# Patient Record
Sex: FEMALE | Race: WHITE | NOT HISPANIC OR LATINO
[De-identification: names, ages, dates, MRNs, and addresses within clinical notes are randomized per-mention and may not be internally consistent; named-entity substitution may affect disease eponyms.]

---

## 2024-08-07 ENCOUNTER — NON-APPOINTMENT (OUTPATIENT)
Age: 89
End: 2024-08-07

## 2024-08-08 ENCOUNTER — TRANSCRIPTION ENCOUNTER (OUTPATIENT)
Age: 89
End: 2024-08-08

## 2024-08-16 ENCOUNTER — TRANSCRIPTION ENCOUNTER (OUTPATIENT)
Age: 89
End: 2024-08-16

## 2024-08-20 ENCOUNTER — INPATIENT (INPATIENT)
Facility: HOSPITAL | Age: 89
LOS: 0 days | Discharge: HOME | DRG: 379 | End: 2024-08-21
Attending: STUDENT IN AN ORGANIZED HEALTH CARE EDUCATION/TRAINING PROGRAM | Admitting: STUDENT IN AN ORGANIZED HEALTH CARE EDUCATION/TRAINING PROGRAM
Payer: MEDICARE

## 2024-08-20 ENCOUNTER — TRANSCRIPTION ENCOUNTER (OUTPATIENT)
Age: 89
End: 2024-08-20

## 2024-08-20 VITALS
RESPIRATION RATE: 16 BRPM | WEIGHT: 139.99 LBS | DIASTOLIC BLOOD PRESSURE: 59 MMHG | SYSTOLIC BLOOD PRESSURE: 143 MMHG | TEMPERATURE: 98 F | HEART RATE: 61 BPM | OXYGEN SATURATION: 95 %

## 2024-08-20 DIAGNOSIS — Z90.49 ACQUIRED ABSENCE OF OTHER SPECIFIED PARTS OF DIGESTIVE TRACT: Chronic | ICD-10-CM

## 2024-08-20 DIAGNOSIS — Z90.711 ACQUIRED ABSENCE OF UTERUS WITH REMAINING CERVICAL STUMP: Chronic | ICD-10-CM

## 2024-08-20 PROCEDURE — 99285 EMERGENCY DEPT VISIT HI MDM: CPT

## 2024-08-20 NOTE — ED ADULT TRIAGE NOTE - CHIEF COMPLAINT QUOTE
pt brought in by sandi who states she was recently admitted to [facility] for GI bleed and today had a BM and was noted to have bright red blood pt currently has no complaints at this time

## 2024-08-21 ENCOUNTER — TRANSCRIPTION ENCOUNTER (OUTPATIENT)
Age: 89
End: 2024-08-21

## 2024-08-21 VITALS
SYSTOLIC BLOOD PRESSURE: 173 MMHG | RESPIRATION RATE: 16 BRPM | DIASTOLIC BLOOD PRESSURE: 67 MMHG | OXYGEN SATURATION: 95 % | TEMPERATURE: 98 F | HEART RATE: 68 BPM

## 2024-08-21 DIAGNOSIS — K92.2 GASTROINTESTINAL HEMORRHAGE, UNSPECIFIED: ICD-10-CM

## 2024-08-21 LAB
ALBUMIN SERPL ELPH-MCNC: 3.1 G/DL — LOW (ref 3.3–5.2)
ALP SERPL-CCNC: 88 U/L — SIGNIFICANT CHANGE UP (ref 40–120)
ALT FLD-CCNC: 14 U/L — SIGNIFICANT CHANGE UP
ANION GAP SERPL CALC-SCNC: 11 MMOL/L — SIGNIFICANT CHANGE UP (ref 5–17)
APTT BLD: 29.1 SEC — SIGNIFICANT CHANGE UP (ref 24.5–35.6)
AST SERPL-CCNC: 15 U/L — SIGNIFICANT CHANGE UP
BASOPHILS # BLD AUTO: 0.12 K/UL — SIGNIFICANT CHANGE UP (ref 0–0.2)
BASOPHILS NFR BLD AUTO: 1.2 % — SIGNIFICANT CHANGE UP (ref 0–2)
BILIRUB SERPL-MCNC: 0.3 MG/DL — LOW (ref 0.4–2)
BLD GP AB SCN SERPL QL: SIGNIFICANT CHANGE UP
BUN SERPL-MCNC: 27.2 MG/DL — HIGH (ref 8–20)
CALCIUM SERPL-MCNC: 9.3 MG/DL — SIGNIFICANT CHANGE UP (ref 8.4–10.5)
CHLORIDE SERPL-SCNC: 106 MMOL/L — SIGNIFICANT CHANGE UP (ref 96–108)
CO2 SERPL-SCNC: 22 MMOL/L — SIGNIFICANT CHANGE UP (ref 22–29)
CREAT SERPL-MCNC: 1.27 MG/DL — SIGNIFICANT CHANGE UP (ref 0.5–1.3)
EGFR: 40 ML/MIN/1.73M2 — LOW
EOSINOPHIL # BLD AUTO: 0.47 K/UL — SIGNIFICANT CHANGE UP (ref 0–0.5)
EOSINOPHIL NFR BLD AUTO: 4.5 % — SIGNIFICANT CHANGE UP (ref 0–6)
GLUCOSE SERPL-MCNC: 125 MG/DL — HIGH (ref 70–99)
HCT VFR BLD CALC: 25.8 % — LOW (ref 34.5–45)
HCT VFR BLD CALC: 28 % — LOW (ref 34.5–45)
HGB BLD-MCNC: 8.4 G/DL — LOW (ref 11.5–15.5)
HGB BLD-MCNC: 9.2 G/DL — LOW (ref 11.5–15.5)
IMM GRANULOCYTES NFR BLD AUTO: 0.3 % — SIGNIFICANT CHANGE UP (ref 0–0.9)
INR BLD: 1.08 RATIO — SIGNIFICANT CHANGE UP (ref 0.85–1.18)
LYMPHOCYTES # BLD AUTO: 1.95 K/UL — SIGNIFICANT CHANGE UP (ref 1–3.3)
LYMPHOCYTES # BLD AUTO: 18.8 % — SIGNIFICANT CHANGE UP (ref 13–44)
MCHC RBC-ENTMCNC: 31.7 PG — SIGNIFICANT CHANGE UP (ref 27–34)
MCHC RBC-ENTMCNC: 32.1 PG — SIGNIFICANT CHANGE UP (ref 27–34)
MCHC RBC-ENTMCNC: 32.6 GM/DL — SIGNIFICANT CHANGE UP (ref 32–36)
MCHC RBC-ENTMCNC: 32.9 GM/DL — SIGNIFICANT CHANGE UP (ref 32–36)
MCV RBC AUTO: 97.4 FL — SIGNIFICANT CHANGE UP (ref 80–100)
MCV RBC AUTO: 97.6 FL — SIGNIFICANT CHANGE UP (ref 80–100)
MONOCYTES # BLD AUTO: 1.23 K/UL — HIGH (ref 0–0.9)
MONOCYTES NFR BLD AUTO: 11.9 % — SIGNIFICANT CHANGE UP (ref 2–14)
NEUTROPHILS # BLD AUTO: 6.55 K/UL — SIGNIFICANT CHANGE UP (ref 1.8–7.4)
NEUTROPHILS NFR BLD AUTO: 63.3 % — SIGNIFICANT CHANGE UP (ref 43–77)
OB PNL STL: POSITIVE
PLATELET # BLD AUTO: 273 K/UL — SIGNIFICANT CHANGE UP (ref 150–400)
PLATELET # BLD AUTO: 278 K/UL — SIGNIFICANT CHANGE UP (ref 150–400)
POTASSIUM SERPL-MCNC: 3.6 MMOL/L — SIGNIFICANT CHANGE UP (ref 3.5–5.3)
POTASSIUM SERPL-SCNC: 3.6 MMOL/L — SIGNIFICANT CHANGE UP (ref 3.5–5.3)
PROT SERPL-MCNC: 5.4 G/DL — LOW (ref 6.6–8.7)
PROTHROM AB SERPL-ACNC: 12 SEC — SIGNIFICANT CHANGE UP (ref 9.5–13)
RBC # BLD: 2.65 M/UL — LOW (ref 3.8–5.2)
RBC # BLD: 2.87 M/UL — LOW (ref 3.8–5.2)
RBC # FLD: 13.2 % — SIGNIFICANT CHANGE UP (ref 10.3–14.5)
RBC # FLD: 13.2 % — SIGNIFICANT CHANGE UP (ref 10.3–14.5)
SODIUM SERPL-SCNC: 139 MMOL/L — SIGNIFICANT CHANGE UP (ref 135–145)
WBC # BLD: 10.35 K/UL — SIGNIFICANT CHANGE UP (ref 3.8–10.5)
WBC # BLD: 9.6 K/UL — SIGNIFICANT CHANGE UP (ref 3.8–10.5)
WBC # FLD AUTO: 10.35 K/UL — SIGNIFICANT CHANGE UP (ref 3.8–10.5)
WBC # FLD AUTO: 9.6 K/UL — SIGNIFICANT CHANGE UP (ref 3.8–10.5)

## 2024-08-21 PROCEDURE — 93970 EXTREMITY STUDY: CPT | Mod: 26

## 2024-08-21 PROCEDURE — 99223 1ST HOSP IP/OBS HIGH 75: CPT | Mod: AI

## 2024-08-21 PROCEDURE — 43235 EGD DIAGNOSTIC BRUSH WASH: CPT

## 2024-08-21 PROCEDURE — 99223 1ST HOSP IP/OBS HIGH 75: CPT | Mod: FS,25

## 2024-08-21 RX ORDER — ACETAMINOPHEN 325 MG/1
650 TABLET ORAL EVERY 6 HOURS
Refills: 0 | Status: DISCONTINUED | OUTPATIENT
Start: 2024-08-21 | End: 2024-08-21

## 2024-08-21 RX ORDER — MAGNESIUM, ALUMINUM HYDROXIDE 200-225/5
30 SUSPENSION, ORAL (FINAL DOSE FORM) ORAL EVERY 4 HOURS
Refills: 0 | Status: DISCONTINUED | OUTPATIENT
Start: 2024-08-21 | End: 2024-08-21

## 2024-08-21 RX ORDER — AMLODIPINE BESYLATE 10 MG/1
5 TABLET ORAL DAILY
Refills: 0 | Status: DISCONTINUED | OUTPATIENT
Start: 2024-08-21 | End: 2024-08-21

## 2024-08-21 RX ORDER — ONDANSETRON 2 MG/ML
4 INJECTION, SOLUTION INTRAMUSCULAR; INTRAVENOUS EVERY 8 HOURS
Refills: 0 | Status: DISCONTINUED | OUTPATIENT
Start: 2024-08-21 | End: 2024-08-21

## 2024-08-21 RX ORDER — PANTOPRAZOLE SODIUM 40 MG
40 TABLET, DELAYED RELEASE (ENTERIC COATED) ORAL ONCE
Refills: 0 | Status: COMPLETED | OUTPATIENT
Start: 2024-08-21 | End: 2024-08-21

## 2024-08-21 RX ORDER — GABAPENTIN 100 MG
300 CAPSULE ORAL THREE TIMES A DAY
Refills: 0 | Status: DISCONTINUED | OUTPATIENT
Start: 2024-08-21 | End: 2024-08-21

## 2024-08-21 RX ORDER — TAMSULOSIN HYDROCHLORIDE 0.4 MG/1
0.4 CAPSULE ORAL AT BEDTIME
Refills: 0 | Status: DISCONTINUED | OUTPATIENT
Start: 2024-08-21 | End: 2024-08-21

## 2024-08-21 RX ORDER — PANTOPRAZOLE SODIUM 40 MG
40 TABLET, DELAYED RELEASE (ENTERIC COATED) ORAL EVERY 12 HOURS
Refills: 0 | Status: DISCONTINUED | OUTPATIENT
Start: 2024-08-21 | End: 2024-08-21

## 2024-08-21 RX ORDER — SUCRALFATE 1 G/10ML
1 SUSPENSION ORAL
Refills: 0 | Status: DISCONTINUED | OUTPATIENT
Start: 2024-08-21 | End: 2024-08-21

## 2024-08-21 RX ADMIN — Medication 300 MILLIGRAM(S): at 14:32

## 2024-08-21 RX ADMIN — Medication 40 MILLIGRAM(S): at 02:55

## 2024-08-21 NOTE — DISCHARGE NOTE PROVIDER - ATTENDING DISCHARGE PHYSICAL EXAMINATION:
CONSTITUTIONAL: no apparent distress  EYES: PERRLA, EOMI, non-icteric  ENMT: Oral mucosa with moist membranes  RESP: No respiratory distress, clear to auscultation bilaterally, no wheezes or rales  CV: RRR, +S1S2  GI: Soft, NT, ND  PSYCH: A+O x 3, mood and affect appropriate  NEURO: Cooperative, upper and lower motor function grossly intact bilaterally, sensation grossly intact throughout  EXTREMITIES: + 2 pedal edema bilaterally, no tenderness, pedal pulses present

## 2024-08-21 NOTE — CONSULT NOTE ADULT - ASSESSMENT
89 y/o F with PMHX HTN, colon resection due to diverticular disease (45 years ago) presented to ED c/o dark stool    #Dark stool, anemia  #Hx of Dieulafoy lesion  EGD (08.09.24)- actively oozing Dielafoy lesion seen in the proximal body of the stomach, gold probe cautery and three endoscopic hemoclips were applied    - NPO for EGD today, 8/21/24  - Trend CBC, transfuse as needed. Monitor for signs of bleeding.   - Avoid NSAIDs  - IV PPI BID for GI mucosal cytoprotection  - Further plan pending EGD  _________________________________________________________________  Assessment and recommendations are final when note is signed by the attending physician.

## 2024-08-21 NOTE — ED PROVIDER NOTE - OBJECTIVE STATEMENT
89y F w/ hx HTN, colon resection due to diverticular disease; presents for rectal bleeding. Pt was just discharged from this hospital after last week after being admitted with melena -- was found to have Dieulafoy lesion on EGD that was cauterized. Pt reports that her stools had been getting lighter since being discharged; however over the past day has had darker stool. Per daughter also had small amount of bright red rectal bleeding tonight. Pt denying abdominal pain, fever or other complaints.

## 2024-08-21 NOTE — ED ADULT NURSE REASSESSMENT NOTE - NSFALLHARMRISKINTERV_ED_ALL_ED

## 2024-08-21 NOTE — ED ADULT NURSE NOTE - ED STAT RN HANDOFF DETAILS
Report given to [facility] JESSE MONTENEGRO Pt at endoscopy during time of report. Pt spoke to endoscope JESSE Cano to advise pt will be going to [facility] [address]; understanding verbalized.

## 2024-08-21 NOTE — DISCHARGE NOTE PROVIDER - NSDCCPCAREPLAN_GEN_ALL_CORE_FT
PRINCIPAL DISCHARGE DIAGNOSIS  Diagnosis: GI bleed  Assessment and Plan of Treatment: Continue pantoprazole as prescribed and follow up with your GI specialist at the next scheduled appointment.   - if you begin to feel shortness of breath, fatigue, or dizziness with standing or moving around please go to the nearest ER for evaluation      SECONDARY DISCHARGE DIAGNOSES  Diagnosis: Bilateral swelling of feet  Assessment and Plan of Treatment: Use compression socks when ambulating and when resting keep your feet elevated. Follow up with your primary care doctor to consider switching from amlodipine to a different medication for blood pressure.    Diagnosis: Urinary retention  Assessment and Plan of Treatment: Monitor your ability to urinate closely and if you feel you are not able to fully empty your bladder or you begin to have pain with urination or urinary frequency.

## 2024-08-21 NOTE — PATIENT PROFILE ADULT - FUNCTIONAL ASSESSMENT - DAILY ACTIVITY SCORE.
If fever or symptoms persist  or worsen over next 48h return to UC or ER   >> follow-up with PCP with in 5-7 days     WHAT TO DRINK :    Pedialyte 6-8 ounces 2-4 x/day  for 3-5 days ; another option  \"Drip Drop \" powdered electrolyte packet   Soup 3-4 times per day x 3 day ( caution if on  Salt restricted diet) not cream based   Coconut water  V 8 Splash  FRUIT- based     NOT water /Gatorade /powerade / vitamin water / electrolyte water: not have enough electrolytes    What to take for  Nose /throat /ear symptoms :    Flonase :  opposite hand opposite nostril  2pf 2xd x 14d   Nasal saline 2-3 puffs 3-4 times per day ×5 days  Mucinex/guaifenesin 400 mg 3 times a day × 10 days  Gargle hot salt water ( donot swallow ) 3-5 x/day   Not DayQuil/ NyQuil /Sudafed /Theraflu/comtrex/dimetapp/Yani-seltzer: not multi symptom product    MAKE AT HOME   >>HONEY + 2 tbsp 3 times per day x3 days of make sure below   2 tblsp of each :  Lemon / apple cider vinegar /tumerick / peter   mix together, take straight  or add added  warm or cold liquid     What to take for stomach/ Intestinal discomfort /bloat     > GAS -X ;  gaviscon   -Probiotics:Choose one, take 2 time per day for length of Rx  Florostor  florogjen  Acidophilus  Aloe Vera Juice     Self care / home instructions   - ibuprofen 2 pills mixed with 2 pills Tylenol, same mouth full up to 3 times per day IF safe for your stomach   - Bathroom:  open windows; fan on ; keep bathroom cool and DRY , open car  windows   -Open windows/ get outside at least 3 times per day to circulate clean air    Vitamins to purchase over the counter    -Vitamin-C 500 mg /day ; Vitamin D 400 units/day ; Zinc 50 mg /day     >SLEEP on stomach/side : allow gravity to allow more air in lungs /aid alveoli filling    >EAT more Protein ,4xd     Purchase PULSE OXIMETER , if oxygen stays below 90% at rest go to ER   If more SOB/cannot finish sentence / CP / incr Fever/ dizziness /  confusion / worsening vomiting +/or diarrhea /feeling more UNWELL>  HOSPITAL ER   ( ph # below )    If STREP + > quarantine 2 days, need Antibiotics ; inform contacts from 72 h ago ,throw away tooth brush after 3 d   If INFLUENZA + > quarantine 4 days;  possible Tamiflu  rx ; inform all contacts  from 7 days  ago  IF RSV + >  causes cough and bronchospasm, highly contagious & may be dangerous for children under 3 yo,  quarantine 4 days, may be prescribed inhaler  If MONO + > Off school/work 1 week , need follow-up labs ,no contact sports/impact activities until seen by PCP ;  see PCP 5-6 days    IF COVID + > quarantine 5 d from positive test   no work/school /sports / travel   RTW  day #6  ,if NO symptoms,   Then mask for 5 more days   purchase pulse Oximeter: if <90% go to ER,  inform contacts from 7 -10 days prior      Other viral illness   Parainfluenza, enterovirus, rhinovirus, adenovirus, HumanMetaPneumoVirus  ( HMPV), Norovirus,  ParvoVirus     18

## 2024-08-21 NOTE — PATIENT PROFILE ADULT - DO YOU FEEL THREATENED BY OTHERS?
Called and LM  Joshua Whatley Will call back to discuss    Need data to make changes before next weeks visit    Creat up    bnp up but not as bad as before  Joshua Whatley no

## 2024-08-21 NOTE — PATIENT PROFILE ADULT - FALL HARM RISK - HARM RISK INTERVENTIONS

## 2024-08-21 NOTE — ED PROVIDER NOTE - CLINICAL SUMMARY MEDICAL DECISION MAKING FREE TEXT BOX
89y F presents with rectal bleeding. Was just discharged from this hospital last week after having gastric Dieulafoy lesion cauterized. Pt hemodynamically stable. Noted to have dark Guaiac+ stool, no gross blood. Initial Hgb improved compared to recent admission. Will consult GI, check serial H/H. 89y F presents with rectal bleeding. Was just discharged from this hospital last week after having gastric Dieulafoy lesion cauterized. Pt hemodynamically stable. Noted to have dark Guaiac+ stool, no gross blood. Initial Hgb improved compared to recent admission. Will consult GI, check serial H/H.    Hgb 9.2 --> 8.4. Pt consented for possible PRBCs. GI consult pending. Admitted to medicine.

## 2024-08-21 NOTE — ED PROVIDER NOTE - PHYSICAL EXAMINATION
Constitutional: Awake, alert, in no acute distress  Eyes: no scleral icterus  HENT: normocephalic, atraumatic, moist oral mucosa  Neck: supple  CV: RRR, no murmur  Pulm: non-labored respirations, CTAB  Abdomen: soft, non-tender, non-distended  Rectal: +external hemorrhoids, +dark stool in vault, no bright red blood noted. Chaperoned by JESSE Vargas.  Extremities: no edema, no deformity  Skin: no rash, no jaundice  Neuro: AAOx3, moving all extremities equally

## 2024-08-21 NOTE — DISCHARGE NOTE PROVIDER - NSDCMRMEDTOKEN_GEN_ALL_CORE_FT
amLODIPine 5 mg oral tablet: 1 tab(s) orally once a day  gabapentin 300 mg oral capsule: 1 cap(s) orally 3 times a day  labetalol 100 mg oral tablet: 1 tab(s) orally 2 times a day  Protonix 40 mg oral delayed release tablet: 1 tab(s) orally 2 times a day  tamsulosin 0.4 mg oral capsule: 1 cap(s) orally once a day (at bedtime)

## 2024-08-21 NOTE — CONSULT NOTE ADULT - SUBJECTIVE AND OBJECTIVE BOX
Chief Complaint:  Patient is a 89y old  Female who presents with a chief complaint of Suspected GI bleeding (21 Aug 2024 06:39)        Patient is a 89y old  Female who presents with a chief complaint of Suspected GI bleeding (21 Aug 2024 06:39)      HPI: 89 y/o F with PMHX HTN, colon resection due to diverticular disease (45 years ago) presented to ED c/o dark stool. Patient notes her daughter who is a physician "did not like the color of the stool." She thinks her bowl movements were starting to become brown but then noticed darker stool (more dark green than black). Patient was recently admitted on 8/8/24 for similar symptoms. She underwent EGD on 8/9/24 that showed actively oozing Dieulafoy lesion seen in the proximal body of the stomach, gold probe cautery and three endoscopic hemoclips were applied. She does not take any blood thinners. Reports occasional NSAIDs use. Hgb 9.2 -> 8.4, BUN 27.2. No imaging available.       PAST MEDICAL & SURGICAL HISTORY:  History of appendectomy      S/P partial resection of colon      S/P partial hysterectomy          REVIEW OF SYSTEMS:   General: Negative  HEENT: Negative  CV: Negative  Respiratory: Negative  GI: See HPI  : Negative  MSK: Negative  Hematologic: Negative  Skin: Negative    MEDICATIONS:   MEDICATIONS  (STANDING):  amLODIPine   Tablet 5 milliGRAM(s) Oral daily  gabapentin 300 milliGRAM(s) Oral three times a day  labetalol 100 milliGRAM(s) Oral two times a day  pantoprazole  Injectable 40 milliGRAM(s) IV Push every 12 hours  sucralfate 1 Gram(s) Oral four times a day  tamsulosin 0.4 milliGRAM(s) Oral at bedtime    MEDICATIONS  (PRN):  acetaminophen     Tablet .. 650 milliGRAM(s) Oral every 6 hours PRN Temp greater or equal to 38C (100.4F), Mild Pain (1 - 3)  aluminum hydroxide/magnesium hydroxide/simethicone Suspension 30 milliLiter(s) Oral every 4 hours PRN Dyspepsia  melatonin 3 milliGRAM(s) Oral at bedtime PRN Insomnia  ondansetron Injectable 4 milliGRAM(s) IV Push every 8 hours PRN Nausea and/or Vomiting          DIET:  Diet, NPO:   Except Medications (08-21-24 @ 09:47) [Active]          ALLERGIES:   Allergies    Allergy Status Unknown    Intolerances        Substance Use:   (  ) never used  (  ) other:  Tobacco Usage:  (   ) never smoked   (   ) former smoker   (   ) current smoker  (     ) pack year  (        ) last cigarette date  Alcohol Usage:    Family History   IBD (  ) Yes   (  ) No  GI Malignancy (  )  Yes    (  ) No    Health Management  Last Colonoscopy:  Last Endoscopy:     VITAL SIGNS:   Vital Signs Last 24 Hrs  T(C): 36.7 (21 Aug 2024 07:50), Max: 36.9 (20 Aug 2024 22:46)  T(F): 98 (21 Aug 2024 07:50), Max: 98.5 (20 Aug 2024 22:46)  HR: 78 (21 Aug 2024 07:50) (61 - 78)  BP: 154/69 (21 Aug 2024 07:50) (143/59 - 154/69)  BP(mean): --  RR: 16 (21 Aug 2024 07:50) (16 - 17)  SpO2: 97% (21 Aug 2024 07:50) (94% - 97%)    Parameters below as of 21 Aug 2024 04:55  Patient On (Oxygen Delivery Method): room air      I&O's Summary      PHYSICAL EXAM:   GENERAL:  No acute distress  HEENT:  NC/AT, conjunctiva clear, sclera anicteric  CHEST:  No increased effort  HEART:  Regular rate  ABDOMEN:  Soft, non-tender, non-distended, no rebound or guarding  SKIN:  Warm, dry  NEURO:  Calm, cooperative    LABS:                        8.4    9.60  )-----------( 273      ( 21 Aug 2024 05:00 )             25.8     Hemoglobin: 8.4 g/dL (08-21-24 @ 05:00)  Hemoglobin: 9.2 g/dL (08-21-24 @ 01:30)    08-21    139  |  106  |  27.2<H>  ----------------------------<  125<H>  3.6   |  22.0  |  1.27    Ca    9.3      21 Aug 2024 01:30    TPro  5.4<L>  /  Alb  3.1<L>  /  TBili  0.3<L>  /  DBili  x   /  AST  15  /  ALT  14  /  AlkPhos  88  08-21    LIVER FUNCTIONS - ( 21 Aug 2024 01:30 )  Alb: 3.1 g/dL / Pro: 5.4 g/dL / ALK PHOS: 88 U/L / ALT: 14 U/L / AST: 15 U/L / GGT: x             PT/INR - ( 21 Aug 2024 01:30 )   PT: 12.0 sec;   INR: 1.08 ratio         PTT - ( 21 Aug 2024 01:30 )  PTT:29.1 sec                RADIOLOGY & ADDITIONAL STUDIES:    < from: EGD (08.09.24 @ 00:00) >    Findings:    Stomach Additional stomach findings - Actively oozing gastric Dieulafoy lesion    seen in the proximal body of the stomach. Gold probe bipolar cautery was    initially applied with some slowing of oozing but with suboptimal results. Three    endoscopic hemoclips were then placed over Dieulafoy lesion with good hemostasis    and no further oozing..    -On retroflexion a small clot was seen with liquid blood. The liquid blood was    successfully suctioned with no bleeding lesion or active bleeding seen in the    fundus..    Duodenum Mucosa Normal mucosa was noted in the whole duodenum. No duodenitis or    ulcerations seen.        Other Interventions:    Three endoclips were successfully applied to the proximal body of the stomach    for the purpose of hemostasis. Three endoscopic hemoclips were successfully    deployed with good hemostasis.    Bi-cap electrocautery was unsuccessfully applied for hemostasis. Three    endoscopic hemoclips were subsequently deployed with good hemostasis.        Impressions:    Normal mucosa in the whole examined duodenum.    - Actively oozing gastric Dieulafoy lesion seen in the proximal body of the    stomach. Gold probe bipolar cautery was initially applied with some slowing of    oozing but with suboptimal results. Three endoscopic hemoclips were then placed    over Dieulafoy lesion with good hemostasis and no further oozing. .    -On retroflexion a small clot was seen with liquid blood. The liquid blood was    successfully suctioned with no bleeding lesion or active bleeding seen in the    fundus. .        Plan:    NPO for tonight. IV Pantoprazole 40 mg. every 12 hours. Keep Hb at 8 grams or    higher. Repeat labs ordered for the AM.        Additional Notes:    Actively bleeding gastric Dieulafoy lesion seen and cauterized and clipped with    good hemostasis and no further bleeding at the conclusion of this procedure.    < end of copied text >

## 2024-08-21 NOTE — ED ADULT NURSE REASSESSMENT NOTE - NS ED NURSE REASSESS COMMENT FT1
Patient remains A&Ox4, denies sob/chest pain, no bloody BM noted this shift.  Patient is admitted to telemetry under medicine service for GIB.  Pending GI consult.  VSS with no complaints voiced.
Assumed care of pt in ED. Pt A&O x 4 presents to ED c/o episode of dark tarry stool. Pt denies complaints during assessment. Abdomen soft and nontender. No N/V/D. Reed in place. Pt remains on tele with . Bed locked and in lowest position. Call bell within reach. Able to make needs known/

## 2024-08-21 NOTE — H&P ADULT - NSHPLABSRESULTS_GEN_ALL_CORE
8.4    9.60  )-----------( 273      ( 21 Aug 2024 05:00 )             25.8     08-21    139  |  106  |  27.2<H>  ----------------------------<  125<H>  3.6   |  22.0  |  1.27    Ca    9.3      21 Aug 2024 01:30    TPro  5.4<L>  /  Alb  3.1<L>  /  TBili  0.3<L>  /  DBili  x   /  AST  15  /  ALT  14  /  AlkPhos  88  08-21    EGD (08.09.24 @ 00:00) >  Stomach Additional stomach findings - Actively oozing gastric Dieulafoy lesion  seen in the proximal body of the stomach. Gold probe bipolar cautery was  initially applied with some slowing of oozing but with suboptimal results. Three  endoscopic hemoclips were then placed over Dieulafoy lesion with good hemostasis  and no further oozing..    -On retroflexion a small clot was seen with liquid blood. The liquid blood was  successfully suctioned with no bleeding lesion or active bleeding seen in the  fundus..      Duodenum Mucosa Normal mucosa was noted in the whole duodenum. No duodenitis or  ulcerations seen.      Other Interventions:  Three endoclips were successfully applied to the proximal body of the stomach  for the purpose of hemostasis. Three endoscopic hemoclips were successfully  deployed with good hemostasis.    Bi-cap electrocautery was unsuccessfully applied for hemostasis. Three  endoscopic hemoclips were subsequently deployed with good hemostasis.    Impressions:  Normal mucosa in the whole examined duodenum.    - Actively oozing gastric Dieulafoy lesion seen in the proximal body of the  stomach. Gold probe bipolar cautery was initially applied with some slowing of  oozing but with suboptimal results. Three endoscopic hemoclips were then placed  over Dieulafoy lesion with good hemostasis and no further oozing. .    -On retroflexion a small clot was seen with liquid blood. The liquid blood was  successfully suctioned with no bleeding lesion or active bleeding seen in the  fundus. .

## 2024-08-21 NOTE — DISCHARGE NOTE PROVIDER - HOSPITAL COURSE
89yo female with hx of HTN, colon resection 45yrs ago due to diverticular disease who presents to the hospital today for concern of progressively darkening stools over one day. Pt recently admitted to [facility] for symptomatic anemia and GI bleed. She underwent EGD which found bleeding ulcer s/p cauterization and hemoclipping. She was discharged 8/16 and she reports feeling like she was getting better afterwards. However, because of dark stool today came to the hospital. Hgb on prior d/c 8.5. 9.2 on arrival to ED today with trend to 8.4. GI consulted. Pt underwent EGD which did not show any evidence of bleeding. Pt remained stable afterwards. During this stay pt's alston from prior admission was removed and she underwent TOV successfully. She is medically optimized for discharge home today.

## 2024-08-21 NOTE — DISCHARGE NOTE PROVIDER - CARE PROVIDER_API CALL
Go Sood  Gastroenterology  [address], [address]  [place] [address]  Phone: [phone]  Fax: [phone]  Follow Up Time: 2 weeks

## 2024-08-21 NOTE — CONSULT NOTE ADULT - NS ATTEND AMEND GEN_ALL_CORE FT
I agree with assessment and plan as above. Pt with recent active upper GI bleed due to Dieulafoy lesion s/p gold probe and clip on 8/9/24. Presenting back now due to recurrence of dark stools. Hgb relatively stable from discharge. Continue PPI. Keep NPO for EGD today. See endoscopy report for post procedure plan.

## 2024-08-21 NOTE — H&P ADULT - NSHPPHYSICALEXAM_GEN_ALL_CORE
T(C): 36.7 (08-21-24 @ 07:50), Max: 36.9 (08-20-24 @ 22:46)  HR: 78 (08-21-24 @ 07:50) (61 - 78)  BP: 154/69 (08-21-24 @ 07:50) (143/59 - 154/69)  RR: 16 (08-21-24 @ 07:50) (16 - 17)  SpO2: 97% (08-21-24 @ 07:50) (94% - 97%)    CONSTITUTIONAL: no apparent distress  EYES: PERRLA, EOMI, non-icteric  ENMT: Oral mucosa with moist membranes  RESP: No respiratory distress, clear to auscultation bilaterally, no wheezes or rales  CV: RRR, +S1S2  GI: Soft, NT, ND  PSYCH: A+O x 3, mood and affect appropriate  NEURO: Cooperative, upper and lower motor function grossly intact bilaterally, sensation grossly intact throughout  EXTREMITIES: + 2 pedal edema bilaterally, no tenderness, pedal pulses present

## 2024-08-21 NOTE — ED ADULT NURSE NOTE - OBJECTIVE STATEMENT
I will STOP taking the medications listed below when I get home from the hospital:    propranolol 40 mg oral tablet  -- 1 tab(s) by mouth 2 times a day
Patient presents with c/o dark tarry stools, recently hospitalized for GIB.  Pt A&Ox4, denies sob/chest pain, respirations even and unlabored, negative JVD/diaphoresis. Abdomen soft, non tender, non distended with bowel sounds x4.  Reed catheter placed within past 14 days present and draining clear urine.  No other concerns voiced at this time.

## 2024-08-21 NOTE — ED ADULT NURSE NOTE - CHIEF COMPLAINT QUOTE
pt brought in by sandi who states she was recently admitted to [facility] for GI bleed and today had a BM and was noted to have bright red blood pt currently has no complaints at this time

## 2024-08-21 NOTE — ED ADULT NURSE NOTE - NSFALLUNIVINTERV_ED_ALL_ED
Bed/Stretcher in lowest position, wheels locked, appropriate side rails in place/Call bell, personal items and telephone in reach/Instruct patient to call for assistance before getting out of bed/chair/stretcher/Non-slip footwear applied when patient is off stretcher/[place] to call system/Physically safe environment - no spills, clutter or unnecessary equipment/Purposeful proactive rounding/Room/bathroom lighting operational, light cord in reach

## 2024-08-21 NOTE — DISCHARGE NOTE NURSING/CASE MANAGEMENT/SOCIAL WORK - PATIENT PORTAL LINK FT
You can access the FollowMyHealth Patient Portal offered by  by registering at the following website: [email or web address]. By joining [employer]’s FollowMyHealth portal, you will also be able to view your health information using other applications (apps) compatible with our system.

## 2024-08-21 NOTE — H&P ADULT - HISTORY OF PRESENT ILLNESS
88yo female with hx of HTN, colon resection 45yrs ago due to diverticular disease who presents to the hospital today for concern of progressively darkening stools over one day. Pt recently admitted to [facility] for symptomatic anemia and GI bleed. She underwent EGD which found bleeding ulcer s/p cauterization and hemoclipping. She was discharged 8/16 and she reports feeling like she was getting better afterwards. Reports having bowel movements that were starting to have more brown in their but still mixed with darker stool (thinks it was dark green more than black). However, this morning she had a bowel movement that was tammy than before (although not tarry like before) and decided to come to the hospital for evaluation. She denies any dizziness, SOB or light handedness with ambulation. Denies fever, HA, vision changes, CP, palpitations, abd pain, n/v, LE pain.    ROS positive for bilateral pedal edema. Pt states it has happened intermittently in the past but cannot provide further details. reports her daughter has been more concerned about her feet swelling up over last 3 days. Pt denies LE pain.

## 2024-08-21 NOTE — H&P ADULT - ASSESSMENT
88yo female with hx of HTN, colon resection 45yrs ago due to diverticular disease who presents to the hospital today for concern of progressively darkening stools over one day. Pt recently admitted to [facility] for symptomatic anemia and GI bleed. She underwent EGD 8/9 which found bleeding ulcer s/p cauterization and hemoclipping. Hgb on d/c 8.5. 9.2 on arrival to ED today with trend to 8.4. Admitted for further management/observation.     Suspected GI bleeding  - prior EGD as above  - hgb 9.2 to 8.4 (recently discharged with hgb 8.4-8.5)  - hemodynamically stable  - trend CBC  - PPI BID and sulcralfate QID  - GI consulted    Bilateral pedal edema  - likely related to lack of mobilization  - also reports hx of intermittent pedal edema   - compression socks  - will check LE duplex given lack of mobility    Urinary retention with alston from prior admission  - c/w flomax started previously  - TOV today  - bladder scan q6hr for residual volume    HTN  - labetalol and amlodipine    Neuropathy  - home gabapentin    DVT ppx: SCDs while in bed if US for DVT is negative    89yo female with hx of HTN, colon resection 45yrs ago due to diverticular disease who presents to the hospital today for concern of progressively darkening stools over one day. Pt recently admitted to [facility] for symptomatic anemia and GI bleed. She underwent EGD 8/9 which found bleeding ulcer s/p cauterization and hemoclipping. Hgb on d/c 8.5. 9.2 on arrival to ED today with trend to 8.4. Admitted for further management/observation.     Suspected GI bleeding  - prior EGD as above  - hgb 9.2 to 8.4 (recently discharged with hgb 8.4-8.5)  - hemodynamically stable  - trend CBC  - PPI BID and sulcralfate QID  - GI consulted  - keep active type and screen    Bilateral pedal edema  - likely related to lack of mobilization  - also reports hx of intermittent pedal edema   - compression socks  - will check LE duplex given lack of mobility    Urinary retention with alston from prior admission  - c/w flomax started previously  - TOV today  - bladder scan q6hr for residual volume    HTN  - labetalol and amlodipine    Neuropathy  - home gabapentin    DVT ppx: SCDs while in bed if US for DVT is negative

## 2024-08-21 NOTE — DISCHARGE NOTE PROVIDER - NSDCFUSCHEDAPPT_GEN_ALL_CORE_FT
Estelle Borja  [facility]  UROLOGY [address] [facility]  Scheduled Appointment: 08/23/2024    Go Sood  [facility]  GASTRO [address]  Scheduled Appointment: 09/05/2024

## 2024-08-23 ENCOUNTER — APPOINTMENT (OUTPATIENT)
Dept: UROLOGY | Facility: CLINIC | Age: 89
End: 2024-08-23

## 2024-08-23 VITALS
WEIGHT: 121 LBS | DIASTOLIC BLOOD PRESSURE: 61 MMHG | HEIGHT: 63 IN | BODY MASS INDEX: 21.44 KG/M2 | SYSTOLIC BLOOD PRESSURE: 138 MMHG

## 2024-08-23 DIAGNOSIS — M48.05 SPINAL STENOSIS, THORACOLUMBAR REGION: ICD-10-CM

## 2024-08-23 DIAGNOSIS — R32 UNSPECIFIED URINARY INCONTINENCE: ICD-10-CM

## 2024-08-23 DIAGNOSIS — Z87.19 PERSONAL HISTORY OF OTHER DISEASES OF THE DIGESTIVE SYSTEM: ICD-10-CM

## 2024-08-23 DIAGNOSIS — R33.9 RETENTION OF URINE, UNSPECIFIED: ICD-10-CM

## 2024-08-23 DIAGNOSIS — Z87.442 PERSONAL HISTORY OF URINARY CALCULI: ICD-10-CM

## 2024-08-23 DIAGNOSIS — Z86.79 PERSONAL HISTORY OF OTHER DISEASES OF THE CIRCULATORY SYSTEM: ICD-10-CM

## 2024-08-23 DIAGNOSIS — K25.4 CHRONIC OR UNSPECIFIED GASTRIC ULCER WITH HEMORRHAGE: ICD-10-CM

## 2024-08-23 PROCEDURE — 99205 OFFICE O/P NEW HI 60 MIN: CPT | Mod: 25

## 2024-08-23 PROCEDURE — 51798 US URINE CAPACITY MEASURE: CPT

## 2024-08-23 PROCEDURE — 51702 INSERT TEMP BLADDER CATH: CPT

## 2024-08-23 PROCEDURE — G2211 COMPLEX E/M VISIT ADD ON: CPT

## 2024-08-23 RX ORDER — ALENDRONATE SODIUM 70 MG/1
70 TABLET ORAL
Refills: 0 | Status: ACTIVE | COMMUNITY
Start: 2024-08-23

## 2024-08-23 RX ORDER — LOSARTAN POTASSIUM 100 MG/1
100 TABLET, FILM COATED ORAL
Refills: 0 | Status: ACTIVE | COMMUNITY
Start: 2024-08-23

## 2024-08-23 RX ORDER — PNV NO.95/FERROUS FUM/FOLIC AC 28MG-0.8MG
100 TABLET ORAL
Refills: 0 | Status: ACTIVE | COMMUNITY
Start: 2024-08-23

## 2024-08-23 RX ORDER — HYDROCHLOROTHIAZIDE 12.5 MG/1
12.5 CAPSULE ORAL
Refills: 0 | Status: ACTIVE | COMMUNITY
Start: 2024-08-23

## 2024-08-23 RX ORDER — KRILL/OM-3/DHA/EPA/PHOSPHO/AST 1000-230MG
81 CAPSULE ORAL
Refills: 0 | Status: ACTIVE | COMMUNITY
Start: 2024-08-23

## 2024-08-23 RX ORDER — LABETALOL HYDROCHLORIDE 100 MG/1
100 TABLET, FILM COATED ORAL
Refills: 0 | Status: ACTIVE | COMMUNITY
Start: 2024-08-23

## 2024-08-23 RX ORDER — AMLODIPINE BESYLATE 5 MG/1
5 TABLET ORAL
Refills: 0 | Status: ACTIVE | COMMUNITY
Start: 2024-08-23

## 2024-08-23 RX ORDER — GABAPENTIN 300 MG/1
300 CAPSULE ORAL
Refills: 0 | Status: ACTIVE | COMMUNITY
Start: 2024-08-23

## 2024-08-26 ENCOUNTER — NON-APPOINTMENT (OUTPATIENT)
Age: 89
End: 2024-08-26

## 2024-08-26 LAB — BACTERIA UR CULT: NORMAL

## 2024-08-28 NOTE — ADDENDUM
[FreeTextEntry1] : 16 Fr Reed catheter inserted without difficulties  The catheter was secured with Stat Lock on her left thigh

## 2024-08-28 NOTE — HISTORY OF PRESENT ILLNESS
[FreeTextEntry1] : 88 y/o [place] resident Accompanied by daughter  reviewed recent records from the hospital  8/9-8/16/24 patient admitted in [facility] for GI bleed d/t bleeding ulcer. During admission pt found to have abdominal distention, UTI and large volume on bladder scan > 300 ml. Alston placed 8/11/24 then removed in hospital. Discharged on tamsulosin and abx  Of note, patient describes chronic history of intermittent painless urinary retention causing her to urinate once in the morning and once in the evening most days x many years  Pt has been using Depends diapers in the evening and wake up with soaked diaper x 6 months  Pt denies dysuria, abdominal, pelvic or flank pain  PVR today 329 ml   Reviewed CT 8/9/24: No kidney or bladder stones. Marked distended bladder +multiple diverticulum Cr 1.27 (8/21/24) UCx +Klebiella pneumonia (8/9/24)  had a long discussion with the patient regarding risk of retention and infection and deterioration of kidney function she is not interested in alston catheter if this can be avoided, but understand this is the safest way for her coming trip recommended she consults with a urologist there after

## 2024-08-28 NOTE — ASSESSMENT
[FreeTextEntry1] : Plan -Urine culture -Pt will be returning to [place] within next 1-2 weeks. Pt instructed to find urologist in [place] for close follow up and obtain disc images and hospital transcript from [facility]  -Alston insertion today. Pt informed of need to have alston change every 5 weeks -D/C tamsulosin   I performed history/review of imaging, discussed treatment plan with patient, agree with above transcription by the JARRELL

## 2024-08-28 NOTE — PHYSICAL EXAM
[General Appearance - In No Acute Distress] : no acute distress [] : no respiratory distress [Abdomen Soft] : soft [Costovertebral Angle Tenderness] : no ~M costovertebral angle tenderness [Oriented To Time, Place, And Person] : oriented to person, place, and time [de-identified] : mod soft abd distension

## 2024-08-28 NOTE — HISTORY OF PRESENT ILLNESS
[FreeTextEntry1] : 89 y/o [place] resident Accompanied by daughter  reviewed recent records from the hospital  8/9-8/16/24 patient admitted in [facility] for GI bleed d/t bleeding ulcer. During admission pt found to have abdominal distention, UTI and large volume on bladder scan > 300 ml. Alston placed 8/11/24 then removed in hospital. Discharged on tamsulosin and abx  Of note, patient describes chronic history of intermittent painless urinary retention causing her to urinate once in the morning and once in the evening most days x many years  Pt has been using Depends diapers in the evening and wake up with soaked diaper x 6 months  Pt denies dysuria, abdominal, pelvic or flank pain  PVR today 329 ml   Reviewed CT 8/9/24: No kidney or bladder stones. Marked distended bladder +multiple diverticulum Cr 1.27 (8/21/24) UCx +Klebiella pneumonia (8/9/24)  had a long discussion with the patient regarding risk of retention and infection and deterioration of kidney function she is not interested in alstno catheter if this can be avoided, but understand this is the safest way for her coming trip recommended she consults with a urologist there after

## 2024-08-28 NOTE — PHYSICAL EXAM
[General Appearance - In No Acute Distress] : no acute distress [] : no respiratory distress [Abdomen Soft] : soft [Costovertebral Angle Tenderness] : no ~M costovertebral angle tenderness [Oriented To Time, Place, And Person] : oriented to person, place, and time [de-identified] : mod soft abd distension

## 2024-09-05 ENCOUNTER — APPOINTMENT (OUTPATIENT)
Dept: GASTROENTEROLOGY | Facility: CLINIC | Age: 89
End: 2024-09-05

## 2024-09-05 DIAGNOSIS — K31.82 DIEULAFOY LESION (HEMORRHAGIC) OF STOMACH AND DUODENUM: ICD-10-CM

## 2024-09-29 PROCEDURE — 85610 PROTHROMBIN TIME: CPT

## 2024-09-29 PROCEDURE — 85025 COMPLETE CBC W/AUTO DIFF WBC: CPT

## 2024-09-29 PROCEDURE — 93970 EXTREMITY STUDY: CPT

## 2024-09-29 PROCEDURE — 99285 EMERGENCY DEPT VISIT HI MDM: CPT | Mod: 25

## 2024-09-29 PROCEDURE — 80053 COMPREHEN METABOLIC PANEL: CPT

## 2024-09-29 PROCEDURE — 85027 COMPLETE CBC AUTOMATED: CPT

## 2024-09-29 PROCEDURE — 44360 SMALL BOWEL ENDOSCOPY: CPT

## 2024-09-29 PROCEDURE — 86901 BLOOD TYPING SEROLOGIC RH(D): CPT

## 2024-09-29 PROCEDURE — 82272 OCCULT BLD FECES 1-3 TESTS: CPT

## 2024-09-29 PROCEDURE — 86900 BLOOD TYPING SEROLOGIC ABO: CPT

## 2024-09-29 PROCEDURE — 96374 THER/PROPH/DIAG INJ IV PUSH: CPT

## 2024-09-29 PROCEDURE — 85730 THROMBOPLASTIN TIME PARTIAL: CPT

## 2024-09-29 PROCEDURE — 36415 COLL VENOUS BLD VENIPUNCTURE: CPT

## 2024-09-29 PROCEDURE — 86850 RBC ANTIBODY SCREEN: CPT

## 2025-03-05 NOTE — ED ADULT NURSE NOTE - NS ED NOTE ABUSE SUSPICION NEGLECT YN
Patient tolerated removal of abscess drain well.  Tip intact and string removed.  New dressing applied.  Patient states understanding of discharge instructions and home per self.  Total nursing time: less than 10 minutes.  
No

## (undated) DEVICE — MASK PROCEED EARLOOP LVL 2 50/BX

## (undated) DEVICE — DRSG 2X2

## (undated) DEVICE — PACK IV START WITH CHG

## (undated) DEVICE — SYR IV FLUSH SALINE 10ML 30/TY

## (undated) DEVICE — SENSOR O2 FINGER ADULT

## (undated) DEVICE — UNDERPAD LINEN SAVER 23 X 36"

## (undated) DEVICE — FORCEP RADIAL JAW 4 W NDL 2.4MM 2.8MM 240CM ORANGE DISP

## (undated) DEVICE — CATH IV SAFE BC 22G X 1" (BLUE)

## (undated) DEVICE — SYR LUER SLIP TIP 50CC

## (undated) DEVICE — SOL IRR BAG H2O 1000ML

## (undated) DEVICE — TUBING ALARIS PUMP MODULE NON-DEHP

## (undated) DEVICE — VENODYNE/SCD SLEEVE CALF MEDIUM

## (undated) DEVICE — DENTURE CUP PINK

## (undated) DEVICE — BITE BLOCK ADULT 20 X 27MM (GREEN)

## (undated) DEVICE — GOWN IMPERV XL

## (undated) DEVICE — DRSG CURITY GAUZE SPONGE 4 X 4" 12-PLY NON-STERILE

## (undated) DEVICE — SYR SLIP 10CC

## (undated) DEVICE — SOL BAG NS 0.9% 1000ML

## (undated) DEVICE — TUBING IV EXTENSION MACRO W CLAVE 7"

## (undated) DEVICE — WARMING BLANKET FULL ADULT

## (undated) DEVICE — SOL IRR BAG NS 0.9% 1000ML